# Patient Record
Sex: FEMALE | Race: WHITE | NOT HISPANIC OR LATINO | Employment: FULL TIME | URBAN - METROPOLITAN AREA
[De-identification: names, ages, dates, MRNs, and addresses within clinical notes are randomized per-mention and may not be internally consistent; named-entity substitution may affect disease eponyms.]

---

## 2021-01-21 ENCOUNTER — TELEPHONE (OUTPATIENT)
Dept: FAMILY MEDICINE | Facility: CLINIC | Age: 56
End: 2021-01-21

## 2021-01-22 NOTE — TELEPHONE ENCOUNTER
Please call the patient and get the records of her OBGYN for the OV with me today. We do not have any records in Epic and she has ?upcoming surgery. Appreciate.if we can get the records before the visit at 1.20 PM.    Thank you,  Tracy Coelho MD on 1/21/2021 at 7:51 PM

## 2023-06-17 ENCOUNTER — NURSE TRIAGE (OUTPATIENT)
Dept: NURSING | Facility: CLINIC | Age: 58
End: 2023-06-17
Payer: COMMERCIAL

## 2023-06-17 ENCOUNTER — HOSPITAL ENCOUNTER (EMERGENCY)
Facility: CLINIC | Age: 58
Discharge: HOME | End: 2023-06-17
Attending: EMERGENCY MEDICINE | Admitting: EMERGENCY MEDICINE
Payer: COMMERCIAL

## 2023-06-17 VITALS
RESPIRATION RATE: 16 BRPM | OXYGEN SATURATION: 99 % | TEMPERATURE: 98 F | HEIGHT: 66 IN | DIASTOLIC BLOOD PRESSURE: 73 MMHG | SYSTOLIC BLOOD PRESSURE: 99 MMHG | HEART RATE: 73 BPM | WEIGHT: 116 LBS | BODY MASS INDEX: 18.64 KG/M2

## 2023-06-17 DIAGNOSIS — T18.128A ESOPHAGEAL OBSTRUCTION DUE TO FOOD IMPACTION: ICD-10-CM
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
## 2023-06-17 LAB — UPPER GI ENDOSCOPY: NORMAL

## 2023-06-17 PROCEDURE — 250N000011 HC RX IP 250 OP 636: Performed by: INTERNAL MEDICINE

## 2023-06-17 PROCEDURE — 250N000013 HC RX MED GY IP 250 OP 250 PS 637: Performed by: EMERGENCY MEDICINE

## 2023-06-17 PROCEDURE — 43247 EGD REMOVE FOREIGN BODY: CPT | Performed by: INTERNAL MEDICINE

## 2023-06-17 PROCEDURE — 43247 EGD REMOVE FOREIGN BODY: CPT

## 2023-06-17 PROCEDURE — G0500 MOD SEDAT ENDO SERVICE >5YRS: HCPCS | Performed by: INTERNAL MEDICINE

## 2023-06-17 PROCEDURE — 99285 EMERGENCY DEPT VISIT HI MDM: CPT | Mod: 25

## 2023-06-17 PROCEDURE — 250N000009 HC RX 250: Performed by: INTERNAL MEDICINE

## 2023-06-17 PROCEDURE — 258N000003 HC RX IP 258 OP 636: Performed by: INTERNAL MEDICINE

## 2023-06-17 RX ORDER — FENTANYL CITRATE 50 UG/ML
INJECTION, SOLUTION INTRAMUSCULAR; INTRAVENOUS
Status: DISCONTINUED
Start: 2023-06-17 | End: 2023-06-17 | Stop reason: HOSPADM

## 2023-06-17 RX ORDER — SODIUM CHLORIDE 9 MG/ML
INJECTION, SOLUTION INTRAVENOUS CONTINUOUS PRN
Status: COMPLETED | OUTPATIENT
Start: 2023-06-17 | End: 2023-06-17

## 2023-06-17 RX ORDER — FENTANYL CITRATE 50 UG/ML
INJECTION, SOLUTION INTRAMUSCULAR; INTRAVENOUS DAILY PRN
Status: COMPLETED | OUTPATIENT
Start: 2023-06-17 | End: 2023-06-17

## 2023-06-17 RX ADMIN — SODIUM CHLORIDE 125 ML/HR: 9 INJECTION, SOLUTION INTRAVENOUS at 19:45

## 2023-06-17 RX ADMIN — FENTANYL CITRATE 100 MCG: 50 INJECTION, SOLUTION INTRAMUSCULAR; INTRAVENOUS at 19:45

## 2023-06-17 RX ADMIN — MIDAZOLAM 2 MG: 1 INJECTION INTRAMUSCULAR; INTRAVENOUS at 19:47

## 2023-06-17 RX ADMIN — ANTACID/ANTIFLATULENT 4 G: 380; 550; 10; 10 GRANULE, EFFERVESCENT ORAL at 17:48

## 2023-06-17 RX ADMIN — FENTANYL CITRATE 50 MCG: 50 INJECTION, SOLUTION INTRAMUSCULAR; INTRAVENOUS at 19:53

## 2023-06-17 RX ADMIN — MIDAZOLAM 2 MG: 1 INJECTION INTRAMUSCULAR; INTRAVENOUS at 19:45

## 2023-06-17 ASSESSMENT — ACTIVITIES OF DAILY LIVING (ADL)
ADLS_ACUITY_SCORE: 35
ADLS_ACUITY_SCORE: 35

## 2023-06-17 NOTE — ED PROVIDER NOTES
"  History     Chief Complaint:  Foreign body in throat     HPI   Padmini Fenton is a 57 year old female who presents to the ED via private vehicle for evaluation of a foreign body in her throat. Around 2330 last night, she ate some chicken, and it lodged in her throat. She had an episode of vomiting after this, but something still feels lodged in her throat. She has tried eating and drinking, but anything she eats she vomits. This is the third time this has happened, and she feels as if she ate too fast last night. No abdominal pain or shortness of breath. She did not choke. No blood thinners. She does not follow with a gastroenterologist.       Independent Historian:   None - Patient Only    Review of External Notes:   Emergency department visit dated 9/2/2018 where she presented for esophageal impaction requiring EGD.      Medications:    The patient denies taking any medications.     Past Medical History:    DVT    Past Surgical History:    Knee arthroscopy      Physical Exam     Patient Vitals for the past 24 hrs:   BP Temp Temp src Pulse Resp SpO2 Height Weight   06/17/23 2130 99/73 -- -- 73 -- -- -- --   06/17/23 2120 -- -- -- 64 16 99 % -- --   06/17/23 2050 103/68 -- -- 61 14 99 % -- --   06/17/23 2030 121/80 -- -- 66 17 99 % -- --   06/17/23 2015 108/74 -- -- 67 15 98 % -- --   06/17/23 2010 -- -- -- 67 13 99 % -- --   06/17/23 2005 105/70 -- -- 75 11 98 % -- --   06/17/23 2000 121/72 -- -- 76 20 97 % -- --   06/17/23 2000 -- -- -- 68 -- -- -- --   06/17/23 1955 (!) 155/94 -- -- 77 19 99 % -- --   06/17/23 1950 105/76 -- -- 65 10 99 % -- --   06/17/23 1946 -- -- -- 79 12 99 % -- --   06/17/23 1945 135/86 -- -- 66 17 100 % -- --   06/17/23 1930 126/80 -- -- 62 12 100 % -- --   06/17/23 1925 124/81 -- -- 69 23 98 % -- --   06/17/23 1920 122/84 -- -- 68 -- 99 % -- --   06/17/23 1721 (!) 143/87 98  F (36.7  C) Oral 72 16 100 % 1.676 m (5' 6\") 52.6 kg (116 lb)        Physical Exam  Constitutional: Well " appearing.  HEENT: Atraumatic.  No visible foreign body in the mouth.  Normal-appearing posterior pharynx.  Moist mucous membranes.  Neck: Soft.  Supple.   Cardiac: Regular rate and rhythm.  No murmur or rub.  Respiratory: Clear to auscultation bilaterally.  No respiratory distress.  No wheezing, rhonchi, or rales.  Abdomen: Soft and nontender.  No rebound or guarding.  Nondistended.  Musculoskeletal: No edema.  Normal range of motion.  Neurologic: Alert and oriented.  Normal tone and bulk. Normal gait.  Skin: No rashes.  No edema.  Psych: Normal affect.  Normal behavior.      Emergency Department Course     Emergency Department Course & Assessments:  Interventions:  Medications   benzocaine 20% (HURRICAINE/TOPEX) 20 % spray (1 spray Mouth/Throat $Given 6/17/23 1945)   sod bicarbonate-citric acid-simethicone (EZ GAS) 2.21-1.53-0.04 g packet 4 g (4 g Oral $Given 6/17/23 1748)   fentaNYL (PF) (SUBLIMAZE) injection ( Intravenous Canceled Entry 6/17/23 1955)   midazolam (VERSED) injection (2 mg Intravenous $Given 6/17/23 1945)   midazolam (VERSED) injection ( Intravenous Canceled Entry 6/17/23 2000)   fentaNYL (PF) (SUBLIMAZE) injection (50 mcg Intravenous $Given 6/17/23 1953)   sodium chloride 0.9% infusion (0 mLs Intravenous Stopped 6/17/23 2006)        Assessments:  1735 I obtained the history and examined the patient as noted above.   2142 I rechecked the patient and discussed discharge.    Independent Interpretation (X-rays, CTs, rhythm strip):      Consultations/Discussion of Management or Tests:  1757 I spoke with Dr. Villareal of GI regarding performing endoscopy here in the ED.    Social Determinants of Health affecting care:   None    Disposition:  The patient was discharged to home.     Impression & Plan      Medical Decision Making:  Padmini Fenton is a 57-year-old woman who is afebrile and hemodynamically stable.  She has what sounds like an esophageal food impaction.  We attempted EZ gas without success and  she immediately vomited that back up.  I contacted Dr. Hiro CLARK who presented for EGD.  She has successful EGD retrieval of food impaction.  She is not drinking without difficulty and feels much improved.  Discharge instructions per GI.  She appears safe for discharge.  Discussed supportive care at home and strict return precautions were given.  Her questions were answered and she was in no distress at time of discharge.    Diagnosis:    ICD-10-CM    1. Esophageal obstruction due to food impaction  K22.2     T18.128A            Scribe Disclosure:  I, Joshua Kjer, am serving as a scribe at 5:38 PM on 6/17/2023 to document services personally performed by Dany Gandhi MD based on my observations and the provider's statements to me.   6/17/2023   Dany Gandhi MD Salay, Nicholas J, MD  06/20/23 0958

## 2023-06-17 NOTE — ED TRIAGE NOTES
Pt ate chicken last night around 2330 and has been unable to swallow   everytime she eats it comes back up

## 2023-06-17 NOTE — TELEPHONE ENCOUNTER
Pt calling she has hx of esophagus blockage and last night had chicken and it is stuck in her esophagus.  Pt intends to go to ED now.  Patiot Dias RN  [facility] Nurse Advisor    Reason for Disposition    Symptoms of food or bone stuck in throat or esophagus (e.g., pain in throat or chest, FB sensation, blood-tinged saliva)    Additional Information    Negative: [1] Severe difficulty swallowing (e.g., drooling or spitting) AND [2] started suddenly after taking a medicine or allergic food    Negative: Wheezing, stridor, hoarseness, or difficulty breathing    Negative: [1] Swollen tongue AND [2] sudden onset    Negative: Sounds like a life-threatening emergency to the triager    Negative: Mouth ulcers are seen    Negative: Sore throat (throat pain with swallowing)    Negative: Swallowed a (non-edible) foreign body    Negative: Feeding tube, questions or concerns related to    Negative: Swelling of tongue    Negative: SEVERE difficulty swallowing (e.g., drooling or spitting, can't swallow water)    Negative: [1] Symptoms of blocked esophagus (e.g., can't swallow normal secretions, drooling) AND [2] present now    Protocols used: SWALLOWING DIFFICULTY-A-AH

## 2023-06-18 NOTE — SEDATION DOCUMENTATION
Upper GI Endoscopy by Dr MARIE Villareal & GI RN Melissa.  ED RN Shaista assist with medication administration.    Chicken visualized in esophagus per Dr Villareal.  VSS during procedure.

## 2023-06-18 NOTE — CONSULTS
[facility]    Gastroenterology Consultation    Date of Admission:  6/17/2023    History of Present Illness   Padmini Fenton is a 57 year old female who presents with food impaction. Around 2330 last night, she ate some chicken, and it lodged in her throat. She had an episode of vomiting after this, but something still feels lodged in her throat. She has tried eating and drinking, but anything she eats she vomits. This is the third time this has happened, and she feels as if she ate too fast last night. No abdominal pain or shortness of breath. She did not choke. No blood thinners.    She had 2 other episodes similar to this in the past which last EGD 2018 for food impaction found to have stenosis suspecting EoE but didn't follow up with repeat EGD and dx was never confirmed. She is otherwise healthy and not taking any antacid and denies heartburn.    Assessment & Plan   Padmini Fenton is a 57 year old female who was admitted on 6/17/2023. I was asked to see the patient for food impaction.  -likely same area of stenosis causing pt's food impaction  -will plan for EGD w/ foreign body remoal  -c/w NPO  -establish IV  -will need outpatient follow up for care    [address] level 2 consult    Active Problems:    * No active hospital problems. *      Rosa Maria Villareal MD  ([place])  [facility]  Office: [phone]  Cell: [phone], please feel free to call the cell    Code Status    No Order      Primary Care Physician   Physician No Ref-Primary      Rosa Maria Villareal MD  ([place])  [facility]  Office: [phone]  Cell: [phone], please feel free to call the cell      Past Medical History   I have reviewed this patient's medical history and updated it with pertinent information if needed.   No past medical history on file.    Past Surgical History   I have reviewed this patient's surgical history and updated it with pertinent information if  needed.  No past surgical history on file.    Prior to Admission Medications   None     Allergies   Allergies   Allergen Reactions     Tylenol [Acetaminophen] Nausea and Vomiting       Social History   I have reviewed this patient's social history and updated it with pertinent information if needed. Padmini Fenton  reports that she has never smoked. She does not have any smokeless tobacco history on file.    Family History   I have reviewed this patient's family history and updated it with pertinent information if needed.   No family history on file.    Review of Systems   The 10 point Review of Systems is negative other than noted in the HPI or here.     Physical Exam   Temp: 98  F (36.7  C) Temp src: Oral BP: 105/70 Pulse: 67   Resp: 13 SpO2: 99 % O2 Device: Nasal cannula Oxygen Delivery: 2 LPM  Vital Signs with Ranges  Temp:  [98  F (36.7  C)] 98  F (36.7  C)  Pulse:  [62-79] 67  Resp:  [10-23] 13  BP: (105-155)/(70-94) 105/70  SpO2:  [97 %-100 %] 99 %  116 lbs 0 oz    Exam:  Constitutional: healthy, alert and no distress  Head: Normocephalic. No masses, lesions, tenderness or abnormalities  Neck: Neck supple. No adenopathy. Thyroid symmetric, normal size,, Carotids without bruits.  ENT: ENT exam normal, no neck nodes or sinus tenderness  Cardiovascular: negative, PMI normal. No lifts, heaves, or thrills. RRR. No murmurs, clicks gallops or rub  Respiratory: negative, Percussion normal. Good diaphragmatic excursion. Lungs clear  Gastrointestinal: Abdomen soft, non-tender. BS normal. No masses, organomegaly  : Deferred  Musculoskeletal: extremities normal- no gross deformities noted, gait normal and normal muscle tone  Skin: no suspicious lesions or rashes  Neurologic: Gait normal. Reflexes normal and symmetric. Sensation grossly WNL.  Psychiatric: mentation appears normal and affect normal/bright  Hematologic/Lymphatic/Immunologic: Normal cervical lymph nodes     Data   Results for orders placed or performed  during the hospital encounter of 06/17/23 (from the past 24 hour(s))   UPPER GI ENDOSCOPY   Result Value Ref Range    Upper GI Endoscopy       [id number] [place]  [address]  _______________________________________________________________________________  Patient Name: Padmini Fenton            Procedure Date: 6/17/2023 8:05 PM  MRN: [id number]                       Account Number: [id number]  YOB: 1965             Admit Type: Emergency Department  Age: 57                               Room:  OUT [facility]  Note Status: Finalized                Attending MD: AMANDA TONEY MD,   Instrument Name: [id number] aly boss    _______________________________________________________________________________     Procedure:                Upper GI endoscopy  Indications:              Foreign body in the esophagus  Providers:                AMANDA TONEY MD  Referring MD:               Medicines:                Midazolam 4 mg IV, Fentanyl 150 micrograms IV,                             Benzocaine spray  Complications:            No immediate complications.  ___________________________________ ____________________________________________  Procedure:                Pre-Anesthesia Assessment:                            - Prior to the procedure, a History and Physical                             was performed, and patient medications and                             allergies were reviewed. The patient is competent.                             The risks and benefits of the procedure and the                             sedation options and risks were discussed with the                             patient. All questions were answered and informed                             consent was obtained. Patient identification and                             proposed procedure were verified by the physician.                             Mental Status Examination: normal. Prophylactic                              Antibiotics: The patient does not require                             prophylactic antibiotics. Prior Anticoagulants: The                             patient has taken no anticoagula nt or antiplatelet                             agents. ASA Grade Assessment: I - A normal, healthy                             patient. After reviewing the risks and benefits,                             the patient was deemed in satisfactory condition to                             undergo the procedure. The anesthesia plan was to                             use moderate sedation / analgesia (conscious                             sedation). Immediately prior to administration of                             medications, the patient was re-assessed for                             adequacy to receive sedatives. The heart rate,                             respiratory rate, oxygen saturations, blood                             pressure, adequacy of pulmonary ventilation, and                             response to care were monitored throughout the                             procedure. The physical status of the patient was                             re-assessed after the procedure.                             After obtaining informed consent, the endoscope was                             passed under direct vision. Throughout the                             procedure, the patient's blood pressure, pulse, and                             oxygen saturations were monitored continuously. The                             Endoscope was introduced through the mouth, and                             advanced to the second part of duodenum. The upper                             GI endoscopy was accomplished without difficulty.                             The patient tolerated the procedure well.                                                                                   Findings:       Food was found in the lower third of the  esophagus. Removal was        accomplished with a regular forceps. Estimated blood loss: none.       One benign-appearing, intrinsic moderate (circumferential scarring or        stenosis; an endoscope may pass) stenosis was found at the        gastroes ophageal junction. The stenosis was traversed.       The entire examined stomach was normal.       The first portion of the duodenum and second portion of the duodenum        were normal.                                                                                   Impression:               - Food in the lower third of the esophagus. Removal                             was successful.                            - Benign-appearing esophageal stenosis.                            - Normal stomach.                            - Normal first portion of the duodenum and second                             portion of the duodenum.                            - No image taken due to Provation software issue.  Recommendation:           - Discharge patient to home.                            - Soft diet for 2 days.                            - Use Prilosec (omeprazole) 20 mg PO BID.                            - Repeat upper endoscopy in 6 weeks for retreatment.                            - Sachin collazo follow up with [facility] at                             [phone]                                                                                   Procedure Code(s):       --- Professional ---       20341, Esophagogastroduodenoscopy, flexible, transoral; with removal of        foreign body(s)  Diagnosis Code(s):       --- Professional ---       T18.128A, Food in esophagus causing other injury, initial encounter       K22.2, Esophageal obstruction       T18.108A, Unspecified foreign body in esophagus causing other injury,        initial encounter    CPT copyright 2021 [employer]. All rights reserved.    The codes documented in this report are  preliminary and upon  review may   be revised to meet current compliance requirements.    Electronic Signature by Dr. Rosa Maria Toney  __________________  ROSA MARIA TONEY MD  6/17/2023 8:10:27 PM  I was physically present for the entire viewing portion of the exam.  ROSA MARIA TONEY MD  Number of Addenda:  0    Note Initiated On: 6/17/2023 8:05 PM